# Patient Record
Sex: FEMALE | Race: WHITE | Employment: FULL TIME | URBAN - METROPOLITAN AREA
[De-identification: names, ages, dates, MRNs, and addresses within clinical notes are randomized per-mention and may not be internally consistent; named-entity substitution may affect disease eponyms.]

---

## 2024-07-09 ENCOUNTER — OFFICE VISIT (OUTPATIENT)
Age: 49
End: 2024-07-09
Payer: COMMERCIAL

## 2024-07-09 VITALS
SYSTOLIC BLOOD PRESSURE: 148 MMHG | HEART RATE: 94 BPM | WEIGHT: 186 LBS | OXYGEN SATURATION: 98 % | BODY MASS INDEX: 29.89 KG/M2 | HEIGHT: 66 IN | DIASTOLIC BLOOD PRESSURE: 80 MMHG

## 2024-07-09 DIAGNOSIS — E78.00 HYPERCHOLESTEREMIA: ICD-10-CM

## 2024-07-09 DIAGNOSIS — Z11.4 SCREENING FOR HIV (HUMAN IMMUNODEFICIENCY VIRUS): ICD-10-CM

## 2024-07-09 DIAGNOSIS — Z00.01 ENCOUNTER FOR WELL ADULT EXAM WITH ABNORMAL FINDINGS: ICD-10-CM

## 2024-07-09 DIAGNOSIS — I10 PRIMARY HYPERTENSION: ICD-10-CM

## 2024-07-09 DIAGNOSIS — Z78.0 ASYMPTOMATIC MENOPAUSE: ICD-10-CM

## 2024-07-09 DIAGNOSIS — Z12.31 OTHER SCREENING MAMMOGRAM: ICD-10-CM

## 2024-07-09 DIAGNOSIS — J45.20 MILD INTERMITTENT ASTHMA WITHOUT COMPLICATION: ICD-10-CM

## 2024-07-09 DIAGNOSIS — Z13.220 ENCOUNTER FOR LIPID SCREENING FOR CARDIOVASCULAR DISEASE: ICD-10-CM

## 2024-07-09 DIAGNOSIS — Z12.11 COLON CANCER SCREENING: ICD-10-CM

## 2024-07-09 DIAGNOSIS — Z11.59 NEED FOR HEPATITIS C SCREENING TEST: ICD-10-CM

## 2024-07-09 DIAGNOSIS — J30.9 ALLERGIC RHINITIS, UNSPECIFIED SEASONALITY, UNSPECIFIED TRIGGER: ICD-10-CM

## 2024-07-09 DIAGNOSIS — Z13.6 ENCOUNTER FOR LIPID SCREENING FOR CARDIOVASCULAR DISEASE: ICD-10-CM

## 2024-07-09 DIAGNOSIS — D64.9 ANEMIA, UNSPECIFIED TYPE: ICD-10-CM

## 2024-07-09 DIAGNOSIS — G43.909 MIGRAINE WITHOUT STATUS MIGRAINOSUS, NOT INTRACTABLE, UNSPECIFIED MIGRAINE TYPE: Primary | ICD-10-CM

## 2024-07-09 PROCEDURE — 99396 PREV VISIT EST AGE 40-64: CPT | Performed by: FAMILY MEDICINE

## 2024-07-09 PROCEDURE — 99204 OFFICE O/P NEW MOD 45 MIN: CPT | Performed by: FAMILY MEDICINE

## 2024-07-09 RX ORDER — ALBUTEROL SULFATE 90 UG/1
2 AEROSOL, METERED RESPIRATORY (INHALATION) EVERY 6 HOURS PRN
Qty: 6.7 G | Refills: 1 | Status: SHIPPED | OUTPATIENT
Start: 2024-07-09

## 2024-07-09 RX ORDER — CETIRIZINE HYDROCHLORIDE 10 MG/1
10 TABLET ORAL DAILY
Qty: 90 TABLET | Refills: 1 | Status: SHIPPED | OUTPATIENT
Start: 2024-07-09

## 2024-07-09 RX ORDER — LISINOPRIL 10 MG/1
10 TABLET ORAL DAILY
COMMUNITY
Start: 2024-04-29 | End: 2024-07-09 | Stop reason: SDUPTHER

## 2024-07-09 RX ORDER — ATORVASTATIN CALCIUM 10 MG/1
10 TABLET, FILM COATED ORAL EVERY EVENING
Qty: 90 TABLET | Refills: 1 | Status: SHIPPED | OUTPATIENT
Start: 2024-07-09

## 2024-07-09 RX ORDER — AMITRIPTYLINE HYDROCHLORIDE 25 MG/1
25 TABLET, FILM COATED ORAL
Qty: 10 TABLET | Refills: 1 | Status: SHIPPED | OUTPATIENT
Start: 2024-07-09

## 2024-07-09 RX ORDER — AMITRIPTYLINE HYDROCHLORIDE 25 MG/1
25 TABLET, FILM COATED ORAL
COMMUNITY
Start: 2024-04-29 | End: 2024-07-09 | Stop reason: SDUPTHER

## 2024-07-09 RX ORDER — CETIRIZINE HYDROCHLORIDE 10 MG/1
10 TABLET ORAL DAILY
COMMUNITY
Start: 2024-04-29 | End: 2024-07-09 | Stop reason: SDUPTHER

## 2024-07-09 RX ORDER — HYDROCHLOROTHIAZIDE 25 MG/1
25 TABLET ORAL DAILY
COMMUNITY
Start: 2024-04-29 | End: 2024-07-09 | Stop reason: SDUPTHER

## 2024-07-09 RX ORDER — ALBUTEROL SULFATE 90 UG/1
AEROSOL, METERED RESPIRATORY (INHALATION)
COMMUNITY
Start: 2024-04-29 | End: 2024-07-09 | Stop reason: SDUPTHER

## 2024-07-09 RX ORDER — HYDROCHLOROTHIAZIDE 25 MG/1
25 TABLET ORAL DAILY
Qty: 90 TABLET | Refills: 1 | Status: SHIPPED | OUTPATIENT
Start: 2024-07-09

## 2024-07-09 RX ORDER — LISINOPRIL 10 MG/1
10 TABLET ORAL DAILY
Qty: 90 TABLET | Refills: 1 | Status: SHIPPED | OUTPATIENT
Start: 2024-07-09

## 2024-07-09 RX ORDER — ATORVASTATIN CALCIUM 10 MG/1
10 TABLET, FILM COATED ORAL EVERY EVENING
COMMUNITY
Start: 2024-04-29 | End: 2024-07-09 | Stop reason: SDUPTHER

## 2024-07-09 NOTE — PROGRESS NOTES
Adult Annual Physical  Name: Luciana Garcia      : 1975      MRN: [id number]  Encounter Provider: MATT Tate  Encounter Date: 2024   Encounter department: [facility]    Assessment & Plan   1. Migraine without status migrainosus, not intractable, unspecified migraine type  Assessment & Plan:  Controlled  with Elavil  Orders:  -     Vitamin B12; Future  -     amitriptyline (ELAVIL) 25 mg tablet; Take 1 tablet (25 mg total) by mouth daily at bedtime as needed for sleep  2. Primary hypertension  Assessment & Plan:  Patient is on Lisinoprl and Hydrochlorothiazide  Orders:  -     lisinopril (ZESTRIL) 10 mg tablet; Take 1 tablet (10 mg total) by mouth daily  -     hydroCHLOROthiazide 25 mg tablet; Take 1 tablet (25 mg total) by mouth daily  3. Allergic rhinitis, unspecified seasonality, unspecified trigger  Assessment & Plan:  Controlled with Zyrtec  Orders:  -     cetirizine (ZyrTEC) 10 mg tablet; Take 1 tablet (10 mg total) by mouth daily  4. Mild intermittent asthma without complication  Assessment & Plan:  Controlled with Albuterol  Orders:  -     albuterol (PROVENTIL HFA,VENTOLIN HFA) 90 mcg/act inhaler; Inhale 2 puffs every 6 (six) hours as needed for wheezing  5. Hypercholesteremia  Assessment & Plan:  Controlled with Atorvastatin  Orders:  -     atorvastatin (LIPITOR) 10 mg tablet; Take 1 tablet (10 mg total) by mouth every evening  6. Encounter for well adult exam with abnormal findings  -     Lipid panel; Future  -     Comprehensive metabolic panel; Future  -     CBC and differential; Future  -     TSH + Free T4; Future  -     Anti-thyroglobulin antibody; Future  7. Encounter for lipid screening for cardiovascular disease  8. Colon cancer screening  -     Ambulatory Referral to Gastroenterology; Future  9. Anemia, unspecified type  -     FSH and LH; Future  -     Vitamin D 1,25 Dihydroxy; Future  -     Vitamin B12; Future  -     Magnesium; Future  10. Other  screening mammogram  -     Mammo breast specimen bilateral; Future  11. Asymptomatic menopause  -     FSH and LH; Future  12. Screening for HIV (human immunodeficiency virus)  -     HIV 1/2 AG/AB w Reflex [facility] for 2 yr old and above; Future  13. Need for hepatitis C screening test  -     Hepatitis C Antibody; Future    Immunizations and preventive care screenings were discussed with patient today. Appropriate education was printed on patient's after visit summary.    Counseling:  Exercise: the importance of regular exercise/physical activity was discussed. Recommend exercise 3-5 times per week for at least 30 minutes.          History of Present Illness     Adult Annual Physical:  Patient presents for annual physical.     Diet and Physical Activity:  - Diet/Nutrition: well balanced diet.  - Exercise: walking.    Depression Screening:  - PHQ-2 Score: 0    General Health:  - Sleep: sleeps well.  - Hearing: normal hearing bilateral ears.  - Vision: wears glasses.  - Dental: regular dental visits.    /GYN Health:  - Follows with GYN: no.   - Menopause: postmenopausal.   - Contraception: menopause.      Advanced Care Planning:  - Has an advanced directive?: no    - Has a durable medical POA?: no    - ACP document given to patient?: yes      Review of Systems   Constitutional:  Negative for chills and fever.        Hair loss   HENT:  Negative for ear pain and sore throat.    Eyes:  Negative for pain and visual disturbance.   Respiratory:  Negative for cough and shortness of breath.    Cardiovascular:  Negative for chest pain and palpitations.   Gastrointestinal:  Negative for abdominal pain and vomiting.   Genitourinary:  Negative for dysuria and hematuria.   Musculoskeletal:  Negative for arthralgias and back pain.   Skin:  Negative for color change and rash.   Neurological:  Negative for seizures and syncope.   All other systems reviewed and are negative.        Objective     /80 (BP Location: Left arm, Patient  "Position: Sitting, Cuff Size: Standard)   Pulse 94   Ht 5' 5.5\" (1.664 m)   Wt 84.4 kg (186 lb)   SpO2 98%   BMI 30.48 kg/m²     Physical Exam  Vitals and nursing note reviewed.   Constitutional:       General: She is not in acute distress.     Appearance: She is well-developed. She is obese.   HENT:      Head: Normocephalic and atraumatic.   Eyes:      Conjunctiva/sclera: Conjunctivae normal.   Cardiovascular:      Rate and Rhythm: Normal rate and regular rhythm.      Heart sounds: No murmur heard.  Pulmonary:      Effort: Pulmonary effort is normal. No respiratory distress.      Breath sounds: Normal breath sounds.   Abdominal:      Palpations: Abdomen is soft.      Tenderness: There is no abdominal tenderness.   Musculoskeletal:         General: No swelling.      Cervical back: Neck supple.   Skin:     General: Skin is warm and dry.      Capillary Refill: Capillary refill takes less than 2 seconds.   Neurological:      Mental Status: She is alert.   Psychiatric:         Mood and Affect: Mood normal.         "

## 2024-07-09 NOTE — LETTER
July 9, 2024     Patient: Luciana Garcia  YOB: 1975  Date of Visit: 7/9/2024      To Whom it May Concern:    Luciana Garcia is under my professional care. Luciana was seen in my office on 7/9/2024. Luciana may return to work on 7/10/24 .    If you have any questions or concerns, please don't hesitate to call.         Sincerely,          MATT Tate        CC: No Recipients

## 2024-07-10 ENCOUNTER — APPOINTMENT (OUTPATIENT)
Age: 49
End: 2024-07-10
Payer: COMMERCIAL

## 2024-07-10 DIAGNOSIS — G43.909 MIGRAINE WITHOUT STATUS MIGRAINOSUS, NOT INTRACTABLE, UNSPECIFIED MIGRAINE TYPE: ICD-10-CM

## 2024-07-10 DIAGNOSIS — Z00.01 ENCOUNTER FOR WELL ADULT EXAM WITH ABNORMAL FINDINGS: ICD-10-CM

## 2024-07-10 DIAGNOSIS — Z78.0 ASYMPTOMATIC MENOPAUSE: ICD-10-CM

## 2024-07-10 DIAGNOSIS — D64.9 ANEMIA, UNSPECIFIED TYPE: ICD-10-CM

## 2024-07-10 DIAGNOSIS — Z11.4 SCREENING FOR HIV (HUMAN IMMUNODEFICIENCY VIRUS): ICD-10-CM

## 2024-07-10 DIAGNOSIS — Z11.59 NEED FOR HEPATITIS C SCREENING TEST: ICD-10-CM

## 2024-07-10 LAB
BASOPHILS # BLD AUTO: 0.04 THOUSANDS/ÂΜL (ref 0–0.1)
BASOPHILS NFR BLD AUTO: 1 % (ref 0–1)
EOSINOPHIL # BLD AUTO: 0.19 THOUSAND/ÂΜL (ref 0–0.61)
EOSINOPHIL NFR BLD AUTO: 2 % (ref 0–6)
ERYTHROCYTE [DISTWIDTH] IN BLOOD BY AUTOMATED COUNT: 13.1 % (ref 11.6–15.1)
FSH SERPL-ACNC: 36.5 MIU/ML
HCT VFR BLD AUTO: 38.4 % (ref 34.8–46.1)
HGB BLD-MCNC: 12.8 G/DL (ref 11.5–15.4)
IMM GRANULOCYTES # BLD AUTO: 0.05 THOUSAND/UL (ref 0–0.2)
IMM GRANULOCYTES NFR BLD AUTO: 1 % (ref 0–2)
LH SERPL-ACNC: 33.6 MIU/ML
LYMPHOCYTES # BLD AUTO: 3.78 THOUSANDS/ÂΜL (ref 0.6–4.47)
LYMPHOCYTES NFR BLD AUTO: 43 % (ref 14–44)
MCH RBC QN AUTO: 30.7 PG (ref 26.8–34.3)
MCHC RBC AUTO-ENTMCNC: 33.3 G/DL (ref 31.4–37.4)
MCV RBC AUTO: 92 FL (ref 82–98)
MONOCYTES # BLD AUTO: 0.62 THOUSAND/ÂΜL (ref 0.17–1.22)
MONOCYTES NFR BLD AUTO: 7 % (ref 4–12)
NEUTROPHILS # BLD AUTO: 4.07 THOUSANDS/ÂΜL (ref 1.85–7.62)
NEUTS SEG NFR BLD AUTO: 46 % (ref 43–75)
NRBC BLD AUTO-RTO: 0 /100 WBCS
PLATELET # BLD AUTO: 335 THOUSANDS/UL (ref 149–390)
PMV BLD AUTO: 12.2 FL (ref 8.9–12.7)
RBC # BLD AUTO: 4.17 MILLION/UL (ref 3.81–5.12)
T4 FREE SERPL-MCNC: 0.87 NG/DL (ref 0.61–1.12)
TSH SERPL DL<=0.05 MIU/L-ACNC: 3.01 UIU/ML (ref 0.45–4.5)
VIT B12 SERPL-MCNC: 384 PG/ML (ref 180–914)
WBC # BLD AUTO: 8.75 THOUSAND/UL (ref 4.31–10.16)

## 2024-07-10 PROCEDURE — 36415 COLL VENOUS BLD VENIPUNCTURE: CPT

## 2024-07-10 PROCEDURE — 87389 HIV-1 AG W/HIV-1&-2 AB AG IA: CPT

## 2024-07-10 PROCEDURE — 83001 ASSAY OF GONADOTROPIN (FSH): CPT

## 2024-07-10 PROCEDURE — 85025 COMPLETE CBC W/AUTO DIFF WBC: CPT

## 2024-07-10 PROCEDURE — 86800 THYROGLOBULIN ANTIBODY: CPT

## 2024-07-10 PROCEDURE — 84443 ASSAY THYROID STIM HORMONE: CPT

## 2024-07-10 PROCEDURE — 82607 VITAMIN B-12: CPT

## 2024-07-10 PROCEDURE — 84439 ASSAY OF FREE THYROXINE: CPT

## 2024-07-10 PROCEDURE — 83735 ASSAY OF MAGNESIUM: CPT

## 2024-07-10 PROCEDURE — 83002 ASSAY OF GONADOTROPIN (LH): CPT

## 2024-07-10 PROCEDURE — 86803 HEPATITIS C AB TEST: CPT

## 2024-07-10 PROCEDURE — 82652 VIT D 1 25-DIHYDROXY: CPT

## 2024-07-11 ENCOUNTER — APPOINTMENT (OUTPATIENT)
Age: 49
End: 2024-07-11
Payer: COMMERCIAL

## 2024-07-11 LAB
1,25(OH)2D SERPL-MCNC: 62 PG/ML (ref 5–200)
ALBUMIN SERPL BCG-MCNC: 3.9 G/DL (ref 3.5–5)
ALP SERPL-CCNC: 71 U/L (ref 34–104)
ALT SERPL W P-5'-P-CCNC: 46 U/L (ref 7–52)
ANION GAP SERPL CALCULATED.3IONS-SCNC: 15 MMOL/L (ref 4–13)
AST SERPL W P-5'-P-CCNC: 30 U/L (ref 13–39)
BILIRUB SERPL-MCNC: 0.45 MG/DL (ref 0.2–1)
BUN SERPL-MCNC: 16 MG/DL (ref 5–25)
CALCIUM SERPL-MCNC: 9 MG/DL (ref 8.4–10.2)
CHLORIDE SERPL-SCNC: 102 MMOL/L (ref 96–108)
CHOLEST SERPL-MCNC: 178 MG/DL
CO2 SERPL-SCNC: 24 MMOL/L (ref 21–32)
CREAT SERPL-MCNC: 0.79 MG/DL (ref 0.6–1.3)
GFR SERPL CREATININE-BSD FRML MDRD: 87 ML/MIN/1.73SQ M
GLUCOSE P FAST SERPL-MCNC: 90 MG/DL (ref 65–99)
HCV AB SER QL: NORMAL
HDLC SERPL-MCNC: 33 MG/DL
HIV 1+2 AB+HIV1 P24 AG SERPL QL IA: NORMAL
HIV 2 AB SERPL QL IA: NORMAL
HIV1 AB SERPL QL IA: NORMAL
HIV1 P24 AG SERPL QL IA: NORMAL
LDLC SERPL CALC-MCNC: 75 MG/DL (ref 0–100)
MAGNESIUM SERPL-MCNC: 2.2 MG/DL (ref 1.9–2.7)
NONHDLC SERPL-MCNC: 145 MG/DL
POTASSIUM SERPL-SCNC: 3.6 MMOL/L (ref 3.5–5.3)
PROT SERPL-MCNC: 7 G/DL (ref 6.4–8.4)
SODIUM SERPL-SCNC: 141 MMOL/L (ref 135–147)
TRIGL SERPL-MCNC: 349 MG/DL

## 2024-07-11 PROCEDURE — 36415 COLL VENOUS BLD VENIPUNCTURE: CPT

## 2024-07-11 PROCEDURE — 80061 LIPID PANEL: CPT

## 2024-07-11 PROCEDURE — 80053 COMPREHEN METABOLIC PANEL: CPT

## 2024-07-12 ENCOUNTER — TELEPHONE (OUTPATIENT)
Age: 49
End: 2024-07-12

## 2024-07-12 LAB — THYROGLOB AB SERPL-ACNC: <1 IU/ML (ref 0–0.9)

## 2024-07-12 NOTE — TELEPHONE ENCOUNTER
Received fax from [facility] for surgical clearance scheduled 8/1/24.  Placed this form in upcoming appts for patient since her appt is on  7/18/24

## 2024-07-18 ENCOUNTER — OFFICE VISIT (OUTPATIENT)
Age: 49
End: 2024-07-18
Payer: COMMERCIAL

## 2024-07-18 VITALS
HEIGHT: 66 IN | DIASTOLIC BLOOD PRESSURE: 82 MMHG | HEART RATE: 90 BPM | OXYGEN SATURATION: 97 % | BODY MASS INDEX: 29.89 KG/M2 | WEIGHT: 186 LBS | SYSTOLIC BLOOD PRESSURE: 125 MMHG

## 2024-07-18 DIAGNOSIS — J30.9 ALLERGIC RHINITIS, UNSPECIFIED SEASONALITY, UNSPECIFIED TRIGGER: ICD-10-CM

## 2024-07-18 DIAGNOSIS — G43.909 MIGRAINE WITHOUT STATUS MIGRAINOSUS, NOT INTRACTABLE, UNSPECIFIED MIGRAINE TYPE: Primary | ICD-10-CM

## 2024-07-18 DIAGNOSIS — I10 PRIMARY HYPERTENSION: ICD-10-CM

## 2024-07-18 DIAGNOSIS — E66.09 CLASS 1 OBESITY DUE TO EXCESS CALORIES WITH SERIOUS COMORBIDITY AND BODY MASS INDEX (BMI) OF 30.0 TO 30.9 IN ADULT: ICD-10-CM

## 2024-07-18 DIAGNOSIS — J45.20 MILD INTERMITTENT ASTHMA WITHOUT COMPLICATION: ICD-10-CM

## 2024-07-18 DIAGNOSIS — E78.00 HYPERCHOLESTEREMIA: ICD-10-CM

## 2024-07-18 PROBLEM — E66.811 CLASS 1 OBESITY DUE TO EXCESS CALORIES WITH BODY MASS INDEX (BMI) OF 30.0 TO 30.9 IN ADULT: Status: ACTIVE | Noted: 2024-07-18

## 2024-07-18 PROCEDURE — 99214 OFFICE O/P EST MOD 30 MIN: CPT | Performed by: FAMILY MEDICINE

## 2024-07-18 RX ORDER — SEMAGLUTIDE 0.25 MG/.5ML
INJECTION, SOLUTION SUBCUTANEOUS
Qty: 2 ML | Refills: 0 | Status: SHIPPED | OUTPATIENT
Start: 2024-07-18

## 2024-07-18 NOTE — PROGRESS NOTES
Ambulatory Visit  Name: Luciana Garcia      : 1975      MRN: [id number]  Encounter Provider: MATT Tate  Encounter Date: 2024   Encounter department: [facility]    Assessment & Plan   1. Migraine without status migrainosus, not intractable, unspecified migraine type  Assessment & Plan:  Controlled  with Elavil  2. Primary hypertension  Assessment & Plan:  Patient is on Lisinoprl and Hydrochlorothiazide  3. Allergic rhinitis, unspecified seasonality, unspecified trigger  Assessment & Plan:  Controlled with Zyrtec  4. Mild intermittent asthma without complication  Assessment & Plan:  Controlled with Albuterol  5. Hypercholesteremia  Assessment & Plan:  Controlled with Atorvastatin  6. Class 1 obesity due to excess calories with serious comorbidity and body mass index (BMI) of 30.0 to 30.9 in adult  Assessment & Plan:  Patient has  tried different diets. Interested in GLP1. Denies family or personal hx of MCT or MEN2. No hx of Pancreatitis.          History of Present Illness     Patient here for a follow up. Denies any symptoms today.  Patient  is interested in weight loss. Patient  has been dealing with weight problems for a while. She is an emotional eater because she is depressed. She is unable to exercise because she has tumor in her foot and will have surgery. She has tried different diet and has not been able to lose weight.    Luciana Garcia is here for chronic conditions f/u. Pt. had labs done prior to today's visit which included Recent Results (from the past 672 hour(s))  -CBC and differential:   Collection Time: 07/10/24  1:47 PM       Result                      Value             Ref Range           WBC                         8.75              4.31 - 10.16*       RBC                         4.17              3.81 - 5.12 *       Hemoglobin                  12.8              11.5 - 15.4 *       Hematocrit                  38.4              34.8 - 46.1  %       MCV                         92                82 - 98 fL          MCH                         30.7              26.8 - 34.3 *       MCHC                        33.3              31.4 - 37.4 *       RDW                         13.1              11.6 - 15.1 %       MPV                         12.2              8.9 - 12.7 fL       Platelets                   335               149 - 390 Th*       nRBC                        0                 /100 WBCs           Segmented %                 46                43 - 75 %           Immature Grans %            1                 0 - 2 %             Lymphocytes %               43                14 - 44 %           Monocytes %                 7                 4 - 12 %            Eosinophils Relative        2                 0 - 6 %             Basophils Relative          1                 0 - 1 %             Absolute Neutrophils        4.07              1.85 - 7.62 *       Absolute Immature Grans     0.05              0.00 - 0.20 *       Absolute Lymphocytes        3.78              0.60 - 4.47 *       Absolute Monocytes          0.62              0.17 - 1.22 *       Eosinophils Absolute        0.19              0.00 - 0.61 *       Basophils Absolute          0.04              0.00 - 0.10 *  -Anti-thyroglobulin antibody:   Collection Time: 07/10/24  1:47 PM       Result                      Value             Ref Range           Thyroglobulin Ab            <1.0              0.0 - 0.9 IU*  -Vitamin D 1,25 Dihydroxy:   Collection Time: 07/10/24  1:47 PM       Result                      Value             Ref Range           Vitamin D 1, 25 Dihydr*     62.0              5.0 - 200.0 *  -Vitamin B12:   Collection Time: 07/10/24  1:47 PM       Result                      Value             Ref Range           Vitamin B-12                384               180 - 914 pg*  -Magnesium:   Collection Time: 07/10/24  1:47 PM       Result                      Value             Ref Range            Magnesium                   2.2               1.9 - 2.7 mg*  -HIV 1/2 AG/AB w Reflex [facility] for 2 yr old and above:   Collection Time: 07/10/24  1:47 PM       Result                      Value             Ref Range           HIV-1 p24 Antigen           Non-Reactive      Non-Reactive        HIV-1 Antibody              Non-Reactive      Non-Reactive        HIV-2 Antibody              Non-Reactive      Non-Reactive        HIV Ag-Ab 5th Gen           Non-Reactive      Non-Reactive   -Hepatitis C Antibody:   Collection Time: 07/10/24  1:47 PM       Result                      Value             Ref Range           Hepatitis C Ab              Non-reactive      Non-Reactive   -Follicle stimulating hormone:   Collection Time: 07/10/24  1:47 PM       Result                      Value             Ref Range           FSH                         36.5              See Comment *  -Luteinizing hormone:   Collection Time: 07/10/24  1:47 PM       Result                      Value             Ref Range           LH                          33.6              See Comment *  -TSH, 3rd generation:   Collection Time: 07/10/24  1:47 PM       Result                      Value             Ref Range           TSH 3RD GENERATON           3.012             0.450 - 4.50*  -T4, free:   Collection Time: 07/10/24  1:47 PM       Result                      Value             Ref Range           Free T4                     0.87              0.61 - 1.12 *  -Lipid panel:   Collection Time: 07/11/24  8:08 AM       Result                      Value             Ref Range           Cholesterol                 178               See Comment *       Triglycerides               349 (H)           See Comment *       HDL, Direct                 33 (L)            >=50 mg/dL          LDL Calculated              75                0 - 100 mg/dL       Non-HDL-Chol (CHOL-HDL)     145               mg/dl          -Comprehensive metabolic panel:   Collection Time:  07/11/24  8:08 AM       Result                      Value             Ref Range           Sodium                      141               135 - 147 mm*       Potassium                   3.6               3.5 - 5.3 mm*       Chloride                    102               96 - 108 mmo*       CO2                         24                21 - 32 mmol*       ANION GAP                   15 (H)            4 - 13 mmol/L       BUN                         16                5 - 25 mg/dL        Creatinine                  0.79              0.60 - 1.30 *       Glucose, Fasting            90                65 - 99 mg/dL       Calcium                     9.0               8.4 - 10.2 m*       AST                         30                13 - 39 U/L         ALT                         46                7 - 52 U/L          Alkaline Phosphatase        71                34 - 104 U/L        Total Protein               7.0               6.4 - 8.4 g/*       Albumin                     3.9               3.5 - 5.0 g/*       Total Bilirubin             0.45              0.20 - 1.00 *       eGFR                        87                ml/min/1.73s*       Review of Systems   Constitutional:  Negative for chills and fever.   HENT:  Negative for ear pain and sore throat.    Eyes:  Negative for pain and visual disturbance.   Respiratory:  Negative for cough and shortness of breath.    Cardiovascular:  Negative for chest pain and palpitations.   Gastrointestinal:  Negative for abdominal pain and vomiting.   Genitourinary:  Negative for dysuria and hematuria.   Musculoskeletal:  Negative for arthralgias (Right foot) and back pain.   Skin:  Negative for color change and rash.   Neurological:  Negative for seizures and syncope.   All other systems reviewed and are negative.    Past Medical History:   Diagnosis Date    Allergic     Anxiety     Disease of thyroid gland     Hypertension     Pre-diabetes      Past Surgical History:   Procedure Laterality  "Date    BREAST SURGERY      GALLBLADDER SURGERY       Family History   Problem Relation Age of Onset    Cancer Maternal Grandmother     Rheum arthritis Paternal Grandmother      Social History     Tobacco Use    Smoking status: Never     Passive exposure: Never    Smokeless tobacco: Never   Vaping Use    Vaping status: Never Used   Substance and Sexual Activity    Alcohol use: Never    Drug use: Never    Sexual activity: Not on file     Current Outpatient Medications on File Prior to Visit   Medication Sig    albuterol (PROVENTIL HFA,VENTOLIN HFA) 90 mcg/act inhaler Inhale 2 puffs every 6 (six) hours as needed for wheezing    amitriptyline (ELAVIL) 25 mg tablet Take 1 tablet (25 mg total) by mouth daily at bedtime as needed for sleep    atorvastatin (LIPITOR) 10 mg tablet Take 1 tablet (10 mg total) by mouth every evening    cetirizine (ZyrTEC) 10 mg tablet Take 1 tablet (10 mg total) by mouth daily    hydroCHLOROthiazide 25 mg tablet Take 1 tablet (25 mg total) by mouth daily    lisinopril (ZESTRIL) 10 mg tablet Take 1 tablet (10 mg total) by mouth daily     No Known Allergies    There is no immunization history on file for this patient.  Objective     /82 (BP Location: Left arm, Patient Position: Sitting, Cuff Size: Standard)   Pulse 90   Ht 5' 5.5\" (1.664 m)   Wt 84.4 kg (186 lb)   SpO2 97%   BMI 30.48 kg/m²     Physical Exam  Vitals and nursing note reviewed.   Constitutional:       General: She is not in acute distress.     Appearance: She is well-developed. She is obese.   HENT:      Head: Normocephalic and atraumatic.   Eyes:      Conjunctiva/sclera: Conjunctivae normal.   Cardiovascular:      Rate and Rhythm: Normal rate and regular rhythm.      Heart sounds: No murmur heard.  Pulmonary:      Effort: Pulmonary effort is normal. No respiratory distress.      Breath sounds: Normal breath sounds.   Abdominal:      Palpations: Abdomen is soft.      Tenderness: There is no abdominal tenderness. "   Musculoskeletal:         General: No swelling.      Cervical back: Neck supple.   Skin:     General: Skin is warm and dry.      Capillary Refill: Capillary refill takes less than 2 seconds.   Neurological:      Mental Status: She is alert.   Psychiatric:         Mood and Affect: Mood normal.

## 2024-07-18 NOTE — ASSESSMENT & PLAN NOTE
Patient has  tried different diets. Interested in GLP1. Denies family or personal hx of MCT or MEN2. No hx of Pancreatitis. Educated on use and side effects.

## 2024-07-19 ENCOUNTER — TELEPHONE (OUTPATIENT)
Age: 49
End: 2024-07-19

## 2024-07-19 ENCOUNTER — APPOINTMENT (OUTPATIENT)
Age: 49
End: 2024-07-19
Payer: COMMERCIAL

## 2024-07-19 DIAGNOSIS — M20.32 HALLUX VARUS, LEFT: ICD-10-CM

## 2024-07-19 LAB
ATRIAL RATE: 81 BPM
P AXIS: 53 DEGREES
PR INTERVAL: 156 MS
QRS AXIS: 19 DEGREES
QRSD INTERVAL: 72 MS
QT INTERVAL: 396 MS
QTC INTERVAL: 460 MS
T WAVE AXIS: 62 DEGREES
VENTRICULAR RATE: 81 BPM

## 2024-07-19 PROCEDURE — 93005 ELECTROCARDIOGRAM TRACING: CPT

## 2024-07-19 PROCEDURE — 93010 ELECTROCARDIOGRAM REPORT: CPT | Performed by: STUDENT IN AN ORGANIZED HEALTH CARE EDUCATION/TRAINING PROGRAM

## 2024-07-19 NOTE — TELEPHONE ENCOUNTER
Scanned blank form into media just in case it got lost.  Patient was just seen of 7/18/24 and had a new patient appt on 7/9/24. I put the from in basket for the providers.

## 2024-10-24 ENCOUNTER — APPOINTMENT (OUTPATIENT)
Age: 49
End: 2024-10-24
Payer: COMMERCIAL

## 2024-10-24 ENCOUNTER — OFFICE VISIT (OUTPATIENT)
Age: 49
End: 2024-10-24
Payer: COMMERCIAL

## 2024-10-24 VITALS
WEIGHT: 195.2 LBS | OXYGEN SATURATION: 96 % | DIASTOLIC BLOOD PRESSURE: 80 MMHG | BODY MASS INDEX: 31.37 KG/M2 | HEART RATE: 100 BPM | RESPIRATION RATE: 18 BRPM | HEIGHT: 66 IN | SYSTOLIC BLOOD PRESSURE: 126 MMHG

## 2024-10-24 DIAGNOSIS — B35.4 TINEA CORPORIS: ICD-10-CM

## 2024-10-24 DIAGNOSIS — B37.9 YEAST INFECTION: ICD-10-CM

## 2024-10-24 DIAGNOSIS — J45.20 MILD INTERMITTENT ASTHMA WITHOUT COMPLICATION: ICD-10-CM

## 2024-10-24 DIAGNOSIS — I10 PRIMARY HYPERTENSION: ICD-10-CM

## 2024-10-24 DIAGNOSIS — E78.00 HYPERCHOLESTEREMIA: ICD-10-CM

## 2024-10-24 DIAGNOSIS — G43.909 MIGRAINE WITHOUT STATUS MIGRAINOSUS, NOT INTRACTABLE, UNSPECIFIED MIGRAINE TYPE: Primary | ICD-10-CM

## 2024-10-24 LAB — TREPONEMA PALLIDUM IGG+IGM AB [PRESENCE] IN SERUM OR PLASMA BY IMMUNOASSAY: NORMAL

## 2024-10-24 PROCEDURE — 87591 N.GONORRHOEAE DNA AMP PROB: CPT

## 2024-10-24 PROCEDURE — 87491 CHLMYD TRACH DNA AMP PROBE: CPT

## 2024-10-24 PROCEDURE — 87660 TRICHOMONAS VAGIN DIR PROBE: CPT | Performed by: FAMILY MEDICINE

## 2024-10-24 PROCEDURE — 99214 OFFICE O/P EST MOD 30 MIN: CPT | Performed by: FAMILY MEDICINE

## 2024-10-24 PROCEDURE — 87480 CANDIDA DNA DIR PROBE: CPT | Performed by: FAMILY MEDICINE

## 2024-10-24 PROCEDURE — 87529 HSV DNA AMP PROBE: CPT

## 2024-10-24 PROCEDURE — 36415 COLL VENOUS BLD VENIPUNCTURE: CPT

## 2024-10-24 PROCEDURE — 86780 TREPONEMA PALLIDUM: CPT

## 2024-10-24 PROCEDURE — 87510 GARDNER VAG DNA DIR PROBE: CPT | Performed by: FAMILY MEDICINE

## 2024-10-24 RX ORDER — CLOTRIMAZOLE AND BETAMETHASONE DIPROPIONATE 10; .64 MG/G; MG/G
CREAM TOPICAL 2 TIMES DAILY
Qty: 45 G | Refills: 0 | Status: SHIPPED | OUTPATIENT
Start: 2024-10-24

## 2024-10-24 NOTE — LETTER
October 24, 2024     Patient: Luciana Garcia  YOB: 1975  Date of Visit: 10/24/2024      To Whom it May Concern:    Luciana Garcia is under my professional care. Luciana was seen in my office on 10/24/2024. Luciana may return to work on 10/25/2024 .    If you have any questions or concerns, please don't hesitate to call.         Sincerely,          MATT Tate        CC: No Recipients

## 2024-10-24 NOTE — PROGRESS NOTES
Ambulatory Visit  Name: Luciana Garcia      : 1975      MRN: [id number]  Encounter Provider: MATT Tate  Encounter Date: 10/24/2024   Encounter department: [facility]    Assessment & Plan  Migraine without status migrainosus, not intractable, unspecified migraine type  Controlled  with Elavil         Primary hypertension  Patient is on Lisinoprl and Hydrochlorothiazide         Mild intermittent asthma without complication  Controlled with Albuterol         Hypercholesteremia  Controlled with Atorvastatin         Yeast infection    Orders:    HSV TYPE 1,2 DNA PCR; Future    RPR-Syphilis Screening (Total Syphilis IGG/IGM); Future    Chlamydia/GC amplified DNA by PCR; Future    Vaginosis DNA Probe; Future    Tinea corporis    Orders:    clotrimazole-betamethasone (LOTRISONE) 1-0.05 % cream; Apply topically 2 (two) times a day       History of Present Illness     Patient here for follow up. Patient thinks she has yeast infection. She took two rounds of antibiotics. Patient is white discharge. Patient reports dry patch to scalp. She has been scratching because she has been stuck in home after her foot surgery. Patient  is still looking for weight loss medication as her insurance denied Wegov[employer]          Review of Systems   Constitutional:  Negative for chills and fever.   HENT:  Negative for ear pain and sore throat.    Eyes:  Negative for pain and visual disturbance.   Respiratory:  Negative for cough and shortness of breath.    Cardiovascular:  Negative for chest pain and palpitations.   Gastrointestinal:  Negative for abdominal pain and vomiting.   Genitourinary:  Negative for dysuria and hematuria.   Musculoskeletal:  Negative for arthralgias and back pain.   Skin:  Negative for color change and rash.   Neurological:  Negative for seizures and syncope.   All other systems reviewed and are negative.          Objective     /80 (BP Location: Right arm, Patient Position:  "Sitting, Cuff Size: Large)   Pulse 100   Resp 18   Ht 5' 5.5\" (1.664 m)   Wt 88.5 kg (195 lb 3.2 oz)   LMP  (LMP Unknown)   SpO2 96%   BMI 31.99 kg/m²     Physical Exam  Vitals and nursing note reviewed.   Constitutional:       General: She is not in acute distress.     Appearance: She is well-developed.   HENT:      Head: Normocephalic and atraumatic.   Eyes:      Conjunctiva/sclera: Conjunctivae normal.   Cardiovascular:      Rate and Rhythm: Normal rate and regular rhythm.      Heart sounds: No murmur heard.  Pulmonary:      Effort: Pulmonary effort is normal. No respiratory distress.      Breath sounds: Normal breath sounds.   Abdominal:      Palpations: Abdomen is soft.      Tenderness: There is no abdominal tenderness.   Musculoskeletal:         General: No swelling.      Cervical back: Neck supple.   Skin:     General: Skin is warm and dry.      Capillary Refill: Capillary refill takes less than 2 seconds.   Neurological:      Mental Status: She is alert.   Psychiatric:         Mood and Affect: Mood normal.         "

## 2024-10-25 LAB
C TRACH DNA SPEC QL NAA+PROBE: NEGATIVE
N GONORRHOEA DNA SPEC QL NAA+PROBE: NEGATIVE

## 2024-10-27 LAB
CANDIDA RRNA VAG QL PROBE: NOT DETECTED
G VAGINALIS RRNA GENITAL QL PROBE: DETECTED
HSV1 DNA SPEC QL NAA+PROBE: NEGATIVE
HSV2 DNA SPEC QL NAA+PROBE: NEGATIVE
T VAGINALIS RRNA GENITAL QL PROBE: NOT DETECTED

## 2024-10-28 DIAGNOSIS — B37.9 YEAST INFECTION: Primary | ICD-10-CM

## 2024-10-28 RX ORDER — METRONIDAZOLE 500 MG/1
500 TABLET ORAL EVERY 8 HOURS SCHEDULED
Qty: 21 TABLET | Refills: 0 | Status: SHIPPED | OUTPATIENT
Start: 2024-10-28 | End: 2024-11-04

## 2024-11-12 ENCOUNTER — TELEPHONE (OUTPATIENT)
Age: 49
End: 2024-11-12

## 2024-11-12 NOTE — TELEPHONE ENCOUNTER
Patient is calling regarding her anxiety and depression. Patient is currently taking amitriptyline at night. Patient is now asking for clonazepam during the day. Please call the patient @ [phone]. Patient is willing to complete a virtual visit if needed. Last OV was 10/24/24. Patient uses [facility]

## 2024-12-06 ENCOUNTER — TELEPHONE (OUTPATIENT)
Age: 49
End: 2024-12-06

## 2024-12-06 ENCOUNTER — OFFICE VISIT (OUTPATIENT)
Age: 49
End: 2024-12-06
Payer: COMMERCIAL

## 2024-12-06 VITALS
TEMPERATURE: 98.2 F | HEART RATE: 101 BPM | OXYGEN SATURATION: 94 % | BODY MASS INDEX: 31.52 KG/M2 | DIASTOLIC BLOOD PRESSURE: 92 MMHG | SYSTOLIC BLOOD PRESSURE: 130 MMHG | HEIGHT: 65 IN | RESPIRATION RATE: 18 BRPM | WEIGHT: 189.2 LBS

## 2024-12-06 DIAGNOSIS — R07.89 RIGHT-SIDED CHEST WALL PAIN: ICD-10-CM

## 2024-12-06 DIAGNOSIS — L30.4 INTERTRIGO: Primary | ICD-10-CM

## 2024-12-06 DIAGNOSIS — G43.909 MIGRAINE WITHOUT STATUS MIGRAINOSUS, NOT INTRACTABLE, UNSPECIFIED MIGRAINE TYPE: ICD-10-CM

## 2024-12-06 PROCEDURE — S9083 URGENT CARE CENTER GLOBAL: HCPCS | Performed by: PHYSICIAN ASSISTANT

## 2024-12-06 PROCEDURE — G0382 LEV 3 HOSP TYPE B ED VISIT: HCPCS | Performed by: PHYSICIAN ASSISTANT

## 2024-12-06 RX ORDER — NYSTATIN 100000 [USP'U]/G
POWDER TOPICAL 2 TIMES DAILY
Qty: 15 G | Refills: 0 | Status: SHIPPED | OUTPATIENT
Start: 2024-12-06

## 2024-12-06 NOTE — TELEPHONE ENCOUNTER
Patient called in and stated that she had a lump in one of her breasts.Patient wanted to be seen today. Castorena over to the office and they had no appts. Was advised she should go to urgent care. Advised patient. Also advised patient that she does have a mammogram on file.

## 2024-12-06 NOTE — LETTER
December 6, 2024     Patient: Luciana Garcia   YOB: 1975   Date of Visit: 12/6/2024       To Whom it May Concern:    Luciana Garcia was seen in my clinic on 12/6/2024. She may return to work on 12/7/2024 .    If you have any questions or concerns, please don't hesitate to call.         Sincerely,          Raúl Lora PA-C        CC: No Recipients

## 2024-12-06 NOTE — PROGRESS NOTES
[facility]        NAME: Luciana Garcia is a 49 y.o. female  : 1975    MRN: [id number]  DATE: 2024  TIME: 2:57 PM    Assessment and Plan   Intertrigo [L30.4]  1. Intertrigo  nystatin (MYCOSTATIN) powder      2. Right-sided chest wall pain              Patient Instructions       Follow up with PCP in 3-5 days.  Proceed to  ER if symptoms worsen.    If tests have been performed at [facility], our office will contact you with results if changes need to be made to the care plan discussed with you at the visit.  You can review your full results on [facility].    Chief Complaint     Chief Complaint   Patient presents with    Blister     Blister under left breast. Was told by her masseuse that it seems bigger than her right breast and should get it checked out.      Chest Pain     Feeling right sided chest pain that comes and goes for the past month.          History of Present Illness       HPI    Review of Systems   Review of Systems      Current Medications       Current Outpatient Medications:     albuterol (PROVENTIL HFA,VENTOLIN HFA) 90 mcg/act inhaler, Inhale 2 puffs every 6 (six) hours as needed for wheezing, Disp: 6.7 g, Rfl: 1    amitriptyline (ELAVIL) 25 mg tablet, TAKE ONE TABLET (25 MG total) BY MOUTH AT BEDTIME AS NEEDED SLEEP, Disp: 10 tablet, Rfl: 1    cetirizine (ZyrTEC) 10 mg tablet, Take 1 tablet (10 mg total) by mouth daily, Disp: 90 tablet, Rfl: 1    diclofenac sodium (VOLTAREN) 50 mg EC tablet, Take 50 mg by mouth 3 (three) times a day as needed, Disp: , Rfl:     hydroCHLOROthiazide 25 mg tablet, Take 1 tablet (25 mg total) by mouth daily, Disp: 90 tablet, Rfl: 1    lisinopril (ZESTRIL) 10 mg tablet, Take 1 tablet (10 mg total) by mouth daily, Disp: 90 tablet, Rfl: 1    nystatin (MYCOSTATIN) powder, Apply topically 2 (two) times a day, Disp: 15 g, Rfl: 0    atorvastatin (LIPITOR) 10 mg tablet, Take 1 tablet (10 mg total) by mouth every evening (Patient not taking:  "Reported on 12/6/2024), Disp: 90 tablet, Rfl: 1    clotrimazole-betamethasone (LOTRISONE) 1-0.05 % cream, Apply topically 2 (two) times a day (Patient not taking: Reported on 12/6/2024), Disp: 45 g, Rfl: 0    Semaglutide-Weight Management (Wegovy) 0.25 MG/0.5ML, Inject 0.25 mg under the skin weekly (Patient not taking: Reported on 12/6/2024), Disp: 2 mL, Rfl: 0    Current Allergies     Allergies as of 12/06/2024    (No Known Allergies)            The following portions of the patient's history were reviewed and updated as appropriate: allergies, current medications, past family history, past medical history, past social history, past surgical history and problem list.     Past Medical History:   Diagnosis Date    Allergic     Anxiety     Disease of thyroid gland     Hypertension     Pre-diabetes        Past Surgical History:   Procedure Laterality Date    BREAST SURGERY      FOOT SURGERY Right 08/2024    GALLBLADDER SURGERY         Family History   Problem Relation Age of Onset    Cancer Maternal Grandmother     Rheum arthritis Paternal Grandmother          Medications have been verified.        Objective   /92 (BP Location: Right arm, Patient Position: Sitting, Cuff Size: Large)   Pulse 101   Temp 98.2 °F (36.8 °C) (Tympanic)   Resp 18   Ht 5' 5\" (1.651 m)   Wt 85.8 kg (189 lb 3.2 oz)   LMP  (LMP Unknown)   SpO2 94%   BMI 31.48 kg/m²   No LMP recorded (lmp unknown). Patient is postmenopausal.       Physical Exam     Physical Exam              " "    The following portions of the patient's history were reviewed and updated as appropriate: allergies, current medications, past family history, past medical history, past social history, past surgical history and problem list.     Past Medical History:   Diagnosis Date    Allergic     Anxiety     Disease of thyroid gland     Hypertension     Pre-diabetes        Past Surgical History:   Procedure Laterality Date    BREAST SURGERY      FOOT SURGERY Right 08/2024    GALLBLADDER SURGERY         Family History   Problem Relation Age of Onset    Cancer Maternal Grandmother     Rheum arthritis Paternal Grandmother          Medications have been verified.        Objective   /92 (BP Location: Right arm, Patient Position: Sitting, Cuff Size: Large)   Pulse 101   Temp 98.2 °F (36.8 °C) (Tympanic)   Resp 18   Ht 5' 5\" (1.651 m)   Wt 85.8 kg (189 lb 3.2 oz)   LMP  (LMP Unknown)   SpO2 94%   BMI 31.48 kg/m²   No LMP recorded (lmp unknown). Patient is postmenopausal.       Physical Exam     Physical Exam  Vitals reviewed.   Constitutional:       General: She is not in acute distress.     Appearance: She is well-developed.   Cardiovascular:      Rate and Rhythm: Normal rate and regular rhythm.      Pulses: Normal pulses.      Heart sounds: Normal heart sounds. No murmur heard.  Pulmonary:      Effort: Pulmonary effort is normal. No respiratory distress.      Breath sounds: Normal breath sounds.   Musculoskeletal:         General: Tenderness present. No deformity.      Right lower leg: No edema.      Left lower leg: No edema.      Comments: Reproducible TTP over the right anterior chest wall in area of pectoral muscle. No visible/palpable STS, ecchymosis, spasm, or deformity noted. Left chest wall is non-tender.    Skin:     Comments: Inferior aspect of center of breast with small localized area of slightly macerated skin with mild erythema which resembles early intertrigo vs cutaneous candidiasis. No discrete " lesion identified.    Neurological:      Mental Status: She is alert and oriented to person, place, and time.

## 2024-12-24 ENCOUNTER — NURSE TRIAGE (OUTPATIENT)
Age: 49
End: 2024-12-24

## 2024-12-24 NOTE — TELEPHONE ENCOUNTER
Regarding: vaginal itching, odor  ----- Message from Kelsea SEXTON sent at 12/24/2024  8:14 AM EST -----  Patient called Rx refill line stating she had a vaginal infection and is hasn't gone away completely. She is requesting pills that she was given and/or topical medication, but did not know the names of the medications. She would like it sent to [facility] pharmacy. Please contact patient.

## 2024-12-24 NOTE — TELEPHONE ENCOUNTER
"Patient reports vaginal itching and odor and would like something prescribed for a yeast infection. She would like a call back to advise.     Reason for Disposition   Caller has NON-URGENT medicine question about med that PCP or specialist prescribed and triager unable to answer question    Answer Assessment - Initial Assessment Questions  1. NAME of MEDICINE: \"What medicine(s) are you calling about?\"      Flagyl, cream  2. QUESTION: \"What is your question?\" (e.g., double dose of medicine, side effect)       Script request   3. PRESCRIBER: \"Who prescribed the medicine?\" Reason: if prescribed by specialist, call should be referred to that group.      PCP  4. SYMPTOMS: \"Do you have any symptoms?\" If Yes, ask: \"What symptoms are you having?\"  \"How bad are the symptoms (e.g., mild, moderate, severe)      Vaginal itching, odor   5. PREGNANCY:  \"Is there any chance that you are pregnant?\" \"When was your last menstrual period?\"      N/A    Protocols used: Medication Question Call-Adult-[place]    "

## 2024-12-25 DIAGNOSIS — B37.9 YEAST INFECTION: Primary | ICD-10-CM

## 2024-12-25 RX ORDER — FLUCONAZOLE 150 MG/1
150 TABLET ORAL ONCE
Qty: 1 TABLET | Refills: 0 | Status: SHIPPED | OUTPATIENT
Start: 2024-12-25 | End: 2024-12-25

## 2024-12-31 ENCOUNTER — TELEPHONE (OUTPATIENT)
Age: 49
End: 2024-12-31

## 2024-12-31 NOTE — TELEPHONE ENCOUNTER
Mansi from [facility] called requesting the mammogram script be faxed to them.  Pt has appt 1/14    Please fax to: [phone]

## 2025-01-14 DIAGNOSIS — Z12.31 ENCOUNTER FOR SCREENING MAMMOGRAM FOR MALIGNANT NEOPLASM OF BREAST: Primary | ICD-10-CM

## 2025-02-13 ENCOUNTER — OFFICE VISIT (OUTPATIENT)
Age: 50
End: 2025-02-13
Payer: COMMERCIAL

## 2025-02-13 VITALS
BODY MASS INDEX: 31.56 KG/M2 | SYSTOLIC BLOOD PRESSURE: 120 MMHG | HEART RATE: 101 BPM | DIASTOLIC BLOOD PRESSURE: 80 MMHG | RESPIRATION RATE: 18 BRPM | OXYGEN SATURATION: 97 % | WEIGHT: 189.4 LBS | HEIGHT: 65 IN

## 2025-02-13 DIAGNOSIS — F41.9 ANXIETY: Primary | ICD-10-CM

## 2025-02-13 DIAGNOSIS — I10 PRIMARY HYPERTENSION: ICD-10-CM

## 2025-02-13 PROCEDURE — 99213 OFFICE O/P EST LOW 20 MIN: CPT | Performed by: FAMILY MEDICINE

## 2025-02-13 RX ORDER — HYDROXYZINE HYDROCHLORIDE 25 MG/1
25 TABLET, FILM COATED ORAL 2 TIMES DAILY PRN
Qty: 20 TABLET | Refills: 0 | Status: SHIPPED | OUTPATIENT
Start: 2025-02-13

## 2025-02-13 RX ORDER — FLUCONAZOLE 150 MG/1
1 TABLET ORAL DAILY
COMMUNITY
Start: 2024-12-26

## 2025-02-13 NOTE — ASSESSMENT & PLAN NOTE
DASH diet (low saturated fat, cholesterol, and total fat; increase fruits and vegetables; fat free or low fat milk or milk products; and increased fiber). Aerobic exercise and limitation of sodium. Weight loss. Continue ACE and HCTZ.

## 2025-02-13 NOTE — LETTER
February 13, 2025     Patient: Luciana Garcia  YOB: 1975  Date of Visit: 2/13/2025      To Whom it May Concern:    Luciana Garcia is under my professional care. Luciana was seen in my office on 2/13/2025. Luciana may return to work on 2/17/24 .    If you have any questions or concerns, please don't hesitate to call.         Sincerely,           Abran Felton MD        CC: No Recipients

## 2025-02-13 NOTE — PROGRESS NOTES
Name: Luicana Garcia      : 1975      MRN: [id number]  Encounter Provider: Abran Felton MD  Encounter Date: 2025   Encounter department: [facility]  :  Assessment & Plan  Anxiety  Get regular physical activity, eat well, reduce caffeine intake, get enough sleep, deal with any issues causing anxiety, stop negative thinking, exercise, meditate. Start zoloft and atarax PRN. Wqork excuse.  Orders:  •  hydrOXYzine HCL (ATARAX) 25 mg tablet; Take 1 tablet (25 mg total) by mouth 2 (two) times a day as needed for itching  •  sertraline (ZOLOFT) 50 mg tablet; Take 1 tablet (50 mg total) by mouth daily    Primary hypertension  DASH diet (low saturated fat, cholesterol, and total fat; increase fruits and vegetables; fat free or low fat milk or milk products; and increased fiber). Aerobic exercise and limitation of sodium. Weight loss. Continue ACE and HCTZ.               History of Present Illness     Luciana Garcia is here for anxiety.  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  Works 12 hours a day Monday to Friday. Stress is because of work. Paused part  time job. Also with some financial stress. Sleep disturbance. Using amytriptyline. Gets groggy next day. Feeling stress for a month. Kids are adults. No . Lives with yougest son. No family problems. Had panic attack today and had to leave work.      Review of Systems   Constitutional:  Negative for fatigue.   Respiratory:  Negative for shortness of breath.    Cardiovascular:  Negative for chest pain.   Gastrointestinal:  Negative for abdominal pain, constipation and diarrhea.   Genitourinary:  Negative for dysuria.   Neurological:  Negative for dizziness.   Psychiatric/Behavioral:  Positive for decreased concentration and sleep disturbance. The patient is nervous/anxious.        Objective   /80 (BP Location: Right arm, Patient Position: Sitting, Cuff  "Size: Standard)   Pulse 101   Resp 18   Ht 5' 5\" (1.651 m)   Wt 85.9 kg (189 lb 6.4 oz)   LMP  (LMP Unknown)   SpO2 97%   BMI 31.52 kg/m²      Physical Exam  Vitals and nursing note reviewed.   Constitutional:       Appearance: She is well-developed.   HENT:      Head: Normocephalic and atraumatic.   Eyes:      Conjunctiva/sclera: Conjunctivae normal.   Cardiovascular:      Rate and Rhythm: Regular rhythm.      Heart sounds: No murmur heard.  Pulmonary:      Breath sounds: Normal breath sounds.   Abdominal:      Palpations: Abdomen is soft.   Musculoskeletal:      Cervical back: Neck supple.   Skin:     General: Skin is warm and dry.   Neurological:      Mental Status: She is alert.   Psychiatric:         Mood and Affect: Mood normal.         "

## 2025-02-13 NOTE — ASSESSMENT & PLAN NOTE
Get regular physical activity, eat well, reduce caffeine intake, get enough sleep, deal with any issues causing anxiety, stop negative thinking, exercise, meditate. Start zoloft and atarax PRN. Wqork excuse.  Orders:  •  hydrOXYzine HCL (ATARAX) 25 mg tablet; Take 1 tablet (25 mg total) by mouth 2 (two) times a day as needed for itching  •  sertraline (ZOLOFT) 50 mg tablet; Take 1 tablet (50 mg total) by mouth daily

## 2025-07-01 ENCOUNTER — TELEPHONE (OUTPATIENT)
Age: 50
End: 2025-07-01

## 2025-07-02 ENCOUNTER — TELEPHONE (OUTPATIENT)
Age: 50
End: 2025-07-02

## 2025-07-18 DIAGNOSIS — G43.909 MIGRAINE WITHOUT STATUS MIGRAINOSUS, NOT INTRACTABLE, UNSPECIFIED MIGRAINE TYPE: ICD-10-CM
